# Patient Record
Sex: FEMALE | Race: WHITE | NOT HISPANIC OR LATINO | URBAN - METROPOLITAN AREA
[De-identification: names, ages, dates, MRNs, and addresses within clinical notes are randomized per-mention and may not be internally consistent; named-entity substitution may affect disease eponyms.]

---

## 2022-01-11 ENCOUNTER — OFFICE VISIT - RIVER FALLS (OUTPATIENT)
Dept: FAMILY MEDICINE | Facility: CLINIC | Age: 42
End: 2022-01-11

## 2022-03-02 VITALS — BODY MASS INDEX: 40.23 KG/M2 | HEIGHT: 61 IN | WEIGHT: 213.1 LBS

## 2022-03-02 NOTE — PROGRESS NOTES
Patient:   JANAK BUSH            MRN: [id number]            FIN: [id number]               Age:   41 years     Sex:  Female     :  1980   Associated Diagnoses:   Obesity   Author:   Debi Barker      Visit Information   Visit type:  Medical Nutrition Therapy.    Referral source:  Nu DUTTON, Dr. Nasim [facility].       Chief Complaint   Obesity       Interval History   Pt has been on some type of diet off and on her whole teen - adult life.  Most success with low carb/ keto but regained all after diet ended.  Pt is tracking calories/ eating healthy and has not been successful with weight loss.    Calorie intake between 1143 - 1469 calories salads variety of greens and vegetables 1-2/day, protein meal replacement shakes up to 2x/ day  fiber 25gm and protein adequate 80gm   Exercise 8000 - 03483 steps/ day using bike or treadmill daily 30 min, interval training     Est needs for weight loss 1100 calories     Chol results total slightly elevated at 212 otherwise HDL and LDL/ VLDL all WNL.    Family hx of diabetes       Health Status   Allergies:    Allergic Reactions (Selected)  No known allergies   Medications:  (Selected)   Prescriptions  Prescribed  Quasense 0.15 mg-30 mcg oral tablet: See Instructions, Instructions: TAKE 1 TABLET BY MOUTH DAILY, # 28 tab(s), 0 Refill(s), Type: Maintenance, Pharmacy: [facility] [id number], pt is due for appt prior to further refills.  Thank you  Documented Medications  Documented  Prilosec OTC: ( 20 mg ), PO, Daily, 0   Problem list:    All Problems (Selected)  TMJ (Temporomandibular Joint Disorder) / ICD-9-.60 / Confirmed  Tinnitus / SNOMED CT [id number] / Confirmed  Obesity / SNOMED CT 1371397752 / Confirmed  Menarche / ICD-9-CM V21.8 / Confirmed  Dyslipidemia / SNOMED CT 38574545 / Confirmed  Eustachian Tube Dysfunction / ICD-9-.81 / Confirmed  Chronic ankle pain / SNOMED CT 085888227 /  Confirmed      Histories   Past Medical History:    Active  TMJ (Temporomandibular Joint Disorder) (524.60): Onset on 4/18/2002 at 22 years.  Eustachian Tube Dysfunction (381.81): Onset on 2/13/2001 at 21 years.  Menarche (V21.8): Onset in 1992 at 12 years.  Obesity (8294457916)  Tinnitus ([id number])  Resolved  Smoker (305.1):  Resolved.  Pregnancy (257391591):  Resolved on 6/20/2003 at 23 years.  Pregnancy (996135922):  Resolved in 2005 at 24 years.  Pregnancy (341482186):  Resolved on 2/15/2006 at 26 years.  Pregnancy (687726976):  Resolved on 4/22/2009 at 29 years.  History of chicken pox (552959012):  Resolved.   Family History:    Polyp of colon  Mother  Comments:  5/17/2011 3:57 PM CDT - Pau Simons  at age 52  Diabetes mellitus type II  Mother  Hypertension  Mother  Father  Nephrolithiasis  Mother  Hepatitis  Mother  Hyperlipidemia  Father     Procedure history:    Laparoscopic cholecystectomy (SNOMED CT [id number]) performed by Doni Domínguez MD on 10/26/2015 at 35 Years.  [place] teeth removed.  Ankle surgery 7/2013.   Social History:        Alcohol Assessment: Current            Current      Tobacco Assessment: Past            Former smoker      Substance Abuse Assessment: Denies Substance Abuse            Never      Employment and Education Assessment            Employed, Work/School description: [employer].      Home and Environment Assessment            Marital status: .  Spouse/Partner name: Lonnie Stoddard.      Nutrition and Health Assessment            Type of diet: Regular.      Exercise and Physical Activity Assessment: Regular exercise            Exercise frequency: 3-4 times/week.  Exercise type: Yoga.      Sexual Assessment            Sexually active: Yes.  Sexual orientation: Heterosexual.                     Comments:                      05/18/2011 - Navid MORALES, [place]                      to Lonnie        Physical Examination   Measurements from flowsheet  : Measurements   1/11/2022 5:06 PM CST Height Measured - Standard 61 in    Weight Measured - Standard 213.1 lb    BSA 2.04 m2    Body Mass Index 40.26 kg/m2  HI         Impression and Plan   Diagnosis     Obesity (PWG86-KP E66.9).         Today patient was instructed on the importance of how nutrition and physical activity can impact weight status and improve overall health conditioning including improving self-esteem and increasing energy level.  We discussed why it is important to incorporate healthy lifestyle interventions to control overall health to prevent complications of being obese including the potential of developing diabetes and preventing heart disease.   We discussed the food plate method.  Patient will be starting 1100 calorie meal plan with appropriate portion sizes.  We discussed how to read food labels. We discussed how calories in should be less than calories expended to achieve optimal weight loss.      Hunger/ Fullness cues - use the chart to help identify how pt is feeling before, during, and after eating   Smart Snacking and 20 Ways to eat more fruit and vegetables.    Mindful eating - listening to body vs eating out of stress, boredom, emotion, eating to fuel body for strength and energy   Weight loss tips  Nutritional Psychiatry Your Brain on Food - discussion on how what you eat affects microbiome/ hormones and how you feel physically and emotionally.   Tracking food - patient is given reference to myfitnesspal.com or apps and encouraged to track daily intake of food and fluids   Provided resources for cooking/ recipe websites  Importance of daily physical activity above work schedule to promote endorphin release    reduce stress, anxiety, and depression, improve sleep, increase energy level, improve muscle tone and strength ...  recommendation to incorporate physical activity at least 5 days per week with minimum of 30-60 minutes of moderately intense activity to promote weight loss.      Because pt is already minimizing calories and exercising.  Recommend Wegovy for weight loss.    Wegovy: proper use, mechanism of action, indications, dosing, injection schedule, safety and side effects.  Pt is shown a demonstration of the use of the pen and was able to return demonstration without difficulty.  Wegovy would be on a monthly dose escalation schedule.     Dose Escalation Schedule  Weeks    Weekly Dose  1 through 4   0.25 mg  5 through 8   0.5 mg  9 through 12   1 mg  13 through 16   1.7 mg  Week 17 and onward  2.4 mg Maintenance dose   If patients do not tolerate a dose during dose escalation,  consider delaying dose escalation for 4 weeks.   The maintenance dose of WEGOVY  is 2.4 mg injected  subcutaneously once-weekly.   If patients do not tolerate the maintenance 2.4 mg once-weekly  dose, the dose can be temporarily decreased to 1.7 mg  once-weekly, for a maximum of 4 weeks. After 4 weeks,  increase WEGOVY  to the maintenance 2.4 mg once-weekly.  Discontinue WEGOVY  if the patient cannot tolerate the 2.4  mg dose.     Goals:  1100 calories  MD to order Wegovy - provided pt with savings card.  Cost may be a barrier       Professional Services   Time spent with pt 30 min   cc Dr. Judge   cc Dr. Roland

## 2022-03-02 NOTE — NURSING NOTE
Quick Intake Entered On:  1/11/2022 5:07 PM CST    Performed On:  1/11/2022 5:06 PM CST by Debi Barker               Summary   Weight Measured :   213.1 lb(Converted to: 213 lb 2 oz, 96.661 kg)    Height Measured :   61 in(Converted to: 5 ft 1 in, 154.94 cm)    Body Mass Index :   40.26 kg/m2 (HI)    Body Surface Area :   2.04 m2   Debi Barker - 1/11/2022 5:06 PM CST